# Patient Record
Sex: FEMALE | Race: WHITE | Employment: FULL TIME | URBAN - METROPOLITAN AREA
[De-identification: names, ages, dates, MRNs, and addresses within clinical notes are randomized per-mention and may not be internally consistent; named-entity substitution may affect disease eponyms.]

---

## 2020-08-17 VITALS
WEIGHT: 214 LBS | HEART RATE: 87 BPM | RESPIRATION RATE: 18 BRPM | TEMPERATURE: 97.8 F | OXYGEN SATURATION: 98 % | HEIGHT: 63 IN | DIASTOLIC BLOOD PRESSURE: 80 MMHG | SYSTOLIC BLOOD PRESSURE: 138 MMHG | BODY MASS INDEX: 37.92 KG/M2

## 2020-08-17 RX ORDER — FLUOXETINE HYDROCHLORIDE 20 MG/1
CAPSULE ORAL DAILY
COMMUNITY

## 2020-08-17 RX ORDER — MOMETASONE FUROATE 50 UG/1
2 SPRAY, METERED NASAL DAILY
COMMUNITY

## 2020-08-17 RX ORDER — FLUCONAZOLE 100 MG/1
200 TABLET ORAL DAILY
COMMUNITY

## 2020-08-17 RX ORDER — METHOCARBAMOL 750 MG/1
TABLET, FILM COATED ORAL 4 TIMES DAILY
COMMUNITY

## 2020-08-17 RX ORDER — AMLODIPINE BESYLATE 10 MG/1
TABLET ORAL DAILY
COMMUNITY

## 2020-08-17 RX ORDER — AMOXICILLIN AND CLAVULANATE POTASSIUM 875; 125 MG/1; MG/1
TABLET, FILM COATED ORAL EVERY 12 HOURS
COMMUNITY

## 2020-08-17 RX ORDER — METFORMIN HYDROCHLORIDE 1000 MG/1
1000 TABLET ORAL 2 TIMES DAILY WITH MEALS
COMMUNITY

## 2020-08-17 RX ORDER — GABAPENTIN 600 MG/1
TABLET ORAL 3 TIMES DAILY
COMMUNITY

## 2020-08-17 RX ORDER — LEVOTHYROXINE SODIUM 88 UG/1
TABLET ORAL
COMMUNITY

## 2020-08-17 RX ORDER — OMEPRAZOLE 40 MG/1
40 CAPSULE, DELAYED RELEASE ORAL DAILY
COMMUNITY

## 2020-08-17 RX ORDER — CELECOXIB 200 MG/1
CAPSULE ORAL 2 TIMES DAILY
COMMUNITY

## 2020-08-17 RX ORDER — LOVASTATIN 40 MG/1
40 TABLET ORAL
COMMUNITY

## 2020-08-17 RX ORDER — TRAMADOL HYDROCHLORIDE 50 MG/1
50 TABLET ORAL
COMMUNITY

## 2020-08-17 RX ORDER — NYSTATIN 100000 U/G
CREAM TOPICAL 2 TIMES DAILY
COMMUNITY

## 2020-08-17 RX ORDER — TRIAMTERENE AND HYDROCHLOROTHIAZIDE 37.5; 25 MG/1; MG/1
CAPSULE ORAL DAILY
COMMUNITY

## 2020-08-17 RX ORDER — VALSARTAN 320 MG/1
TABLET ORAL DAILY
COMMUNITY

## 2020-08-17 RX ORDER — LOSARTAN POTASSIUM 100 MG/1
100 TABLET ORAL DAILY
COMMUNITY

## 2020-08-17 RX ORDER — MELOXICAM 15 MG/1
15 TABLET ORAL DAILY
COMMUNITY

## 2020-08-17 RX ORDER — ALBUTEROL SULFATE 90 UG/1
AEROSOL, METERED RESPIRATORY (INHALATION)
COMMUNITY

## 2020-08-17 RX ORDER — DULAGLUTIDE 0.75 MG/.5ML
0.75 INJECTION, SOLUTION SUBCUTANEOUS
COMMUNITY

## 2020-08-17 RX ORDER — FLUTICASONE PROPIONATE AND SALMETEROL 250; 50 UG/1; UG/1
1 POWDER RESPIRATORY (INHALATION) EVERY 12 HOURS
COMMUNITY

## 2020-10-20 ENCOUNTER — TELEPHONE (OUTPATIENT)
Dept: ENT CLINIC | Age: 70
End: 2020-10-20

## 2020-10-20 NOTE — TELEPHONE ENCOUNTER
Need to reschedule appt.  she has with us on Thursday Janurary 14 due to Dr. Tim Gorman being in surgery.

## 2021-09-13 ENCOUNTER — TRANSCRIBE ORDER (OUTPATIENT)
Dept: SCHEDULING | Age: 71
End: 2021-09-13

## 2021-09-13 DIAGNOSIS — R07.9 CHEST PAIN: Primary | ICD-10-CM

## 2021-09-13 DIAGNOSIS — R42 DIZZINESS: Primary | ICD-10-CM

## 2021-09-13 DIAGNOSIS — R07.9 CHEST PAIN: ICD-10-CM

## 2021-09-14 ENCOUNTER — TRANSCRIBE ORDER (OUTPATIENT)
Dept: SCHEDULING | Age: 71
End: 2021-09-14

## 2021-09-14 DIAGNOSIS — R07.9 CHEST PAIN: Primary | ICD-10-CM

## 2021-09-17 ENCOUNTER — HOSPITAL ENCOUNTER (OUTPATIENT)
Dept: VASCULAR SURGERY | Age: 71
Discharge: HOME | End: 2021-09-17
Attending: NURSE PRACTITIONER
Payer: COMMERCIAL

## 2021-09-17 ENCOUNTER — HOSPITAL ENCOUNTER (OUTPATIENT)
Dept: NUCLEAR MEDICINE | Age: 71
Discharge: HOME | End: 2021-09-17
Attending: NURSE PRACTITIONER
Payer: COMMERCIAL

## 2021-09-17 ENCOUNTER — HOSPITAL ENCOUNTER (OUTPATIENT)
Dept: NON INVASIVE DIAGNOSTICS | Age: 71
Discharge: HOME | End: 2021-09-17
Attending: NURSE PRACTITIONER
Payer: COMMERCIAL

## 2021-09-17 DIAGNOSIS — R07.9 CHEST PAIN: ICD-10-CM

## 2021-09-17 DIAGNOSIS — R42 DIZZINESS: ICD-10-CM

## 2021-09-17 LAB
ECHO AO ROOT DIAM: 2.98 CM
ECHO AV AREA PEAK VELOCITY: 2.29 CM2
ECHO AV AREA VTI: 2.77 CM2
ECHO AV MEAN GRADIENT: 3.25 MMHG
ECHO AV MEAN VELOCITY: 83.52 CM/S
ECHO AV PEAK GRADIENT: 6.89 MMHG
ECHO AV PEAK VELOCITY: 131.22 CM/S
ECHO AV VTI: 21.92 CM
ECHO LA VOL 2C: 46.01 ML (ref 22–52)
ECHO LA VOL 4C: 34.43 ML (ref 22–52)
ECHO LA VOL BP: 46.34 ML (ref 22–52)
ECHO LV EDV A2C: 122.36 ML
ECHO LV EDV BP: 48.46 ML (ref 56–104)
ECHO LV EJECTION FRACTION A2C: 68 PERCENT
ECHO LV EJECTION FRACTION A4C: 78 PERCENT
ECHO LV EJECTION FRACTION A4C: 78 PERCENT
ECHO LV EJECTION FRACTION BIPLANE: 73.1 PERCENT (ref 55–100)
ECHO LV ESV A2C: 15.74 ML
ECHO LV ESV BP: 13.02 ML (ref 19–49)
ECHO LV INTERNAL DIMENSION DIASTOLIC: 5.07 CM (ref 3.9–5.3)
ECHO LV INTERNAL DIMENSION SYSTOLIC: 3.23 CM
ECHO LV IVSD: 0.94 CM (ref 0.6–0.9)
ECHO LV MASS 2D: 169.1 G (ref 67–162)
ECHO LV POSTERIOR WALL DIASTOLIC: 0.92 CM (ref 0.6–0.9)
ECHO LVOT DIAM: 1.85 CM
ECHO LVOT PEAK GRADIENT: 4.99 MMHG
ECHO LVOT PEAK VELOCITY: 111.65 CM/S
ECHO LVOT SV: 96.9 ML
ECHO LVOT SV: 96.9 ML
ECHO LVOT VTI: 22.54 CM
ECHO MV A VELOCITY: 103.5 CM/S
ECHO MV AREA PHT: 3.79 CM2
ECHO MV AREA PHT: 3.79 CM2
ECHO MV E DECELERATION TIME (DT): 200.25 MS
ECHO MV E VELOCITY: 80.99 CM/S
ECHO MV E/A RATIO: 0.78
ECHO MV PRESSURE HALF TIME (PHT): 58.07 MS
ECHO RA AREA 4C: 18 CM2
ECHO RV INTERNAL DIMENSION: 3.02 CM
LEFT CCA DIST DIAS: 20 CM/S
LEFT CCA DIST SYS: 71 CM/S
LEFT CCA PROX DIAS: 13 CM/S
LEFT CCA PROX SYS: 62 CM/S
LEFT ECA SYS: 94 CM/S
LEFT ICA DIST DIAS: 25 CM/S
LEFT ICA DIST SYS: 86 CM/S
LEFT ICA MID DIAS: 29 CM/S
LEFT ICA MID SYS: 114 CM/S
LEFT ICA PROX DIAS: 20 CM/S
LEFT ICA PROX SYS: 84 CM/S
LEFT ICA/CCA SYS: 1.6
LEFT SUBCLAVIAN SYS: 91 CM/S
LEFT VERTEBRAL SYS: 52 CM/S
LVOT MG: 2.32 MMHG
RIGHT CCA DIST DIAS: 19 CM/S
RIGHT CCA DIST SYS: 80 CM/S
RIGHT CCA PROX DIAS: 11 CM/S
RIGHT CCA PROX SYS: 73 CM/S
RIGHT ECA SYS: 107 CM/S
RIGHT ICA DIST DIAS: 26 CM/S
RIGHT ICA DIST SYS: 118 CM/S
RIGHT ICA MID DIAS: 24 CM/S
RIGHT ICA MID SYS: 66 CM/S
RIGHT ICA PROX DIAS: 23 CM/S
RIGHT ICA PROX SYS: 67 CM/S
RIGHT ICA/CCA SYS: 1.5
RIGHT SUBCLAVIAN SYS: 87 CM/S
RIGHT VERTEBRAL SYS: 48 CM/S
STRESS BASELINE DIAS BP: 78 MMHG
STRESS BASELINE HR: 79 BPM
STRESS BASELINE SYS BP: 136 MMHG
STRESS ESTIMATED WORKLOAD: 1 METS
STRESS EXERCISE DUR MIN: NORMAL MIN:SEC
STRESS PEAK DIAS BP: 70 MMHG
STRESS PEAK SYS BP: 169 MMHG
STRESS PERCENT HR ACHIEVED: 66 %
STRESS POST PEAK HR: 99 BPM
STRESS RATE PRESSURE PRODUCT: NORMAL BPM*MMHG
STRESS TARGET HR: 150 BPM

## 2021-09-17 PROCEDURE — 93880 EXTRACRANIAL BILAT STUDY: CPT

## 2021-09-17 PROCEDURE — 93306 TTE W/DOPPLER COMPLETE: CPT

## 2021-09-17 PROCEDURE — 74011000258 HC RX REV CODE- 258: Performed by: NURSE PRACTITIONER

## 2021-09-17 PROCEDURE — A9500 TC99M SESTAMIBI: HCPCS

## 2021-09-17 PROCEDURE — 74011250636 HC RX REV CODE- 250/636: Performed by: NURSE PRACTITIONER

## 2021-09-17 PROCEDURE — 93017 CV STRESS TEST TRACING ONLY: CPT

## 2021-09-17 RX ORDER — TETRAKIS(2-METHOXYISOBUTYLISOCYANIDE)COPPER(I) TETRAFLUOROBORATE 1 MG/ML
30 INJECTION, POWDER, LYOPHILIZED, FOR SOLUTION INTRAVENOUS
Status: COMPLETED | OUTPATIENT
Start: 2021-09-17 | End: 2021-09-17

## 2021-09-17 RX ORDER — TETRAKIS(2-METHOXYISOBUTYLISOCYANIDE)COPPER(I) TETRAFLUOROBORATE 1 MG/ML
10 INJECTION, POWDER, LYOPHILIZED, FOR SOLUTION INTRAVENOUS
Status: COMPLETED | OUTPATIENT
Start: 2021-09-17 | End: 2021-09-17

## 2021-09-17 RX ADMIN — TETRAKIS(2-METHOXYISOBUTYLISOCYANIDE)COPPER(I) TETRAFLUOROBORATE 10 MILLICURIE: 1 INJECTION, POWDER, LYOPHILIZED, FOR SOLUTION INTRAVENOUS at 07:26

## 2021-09-17 RX ADMIN — TETRAKIS(2-METHOXYISOBUTYLISOCYANIDE)COPPER(I) TETRAFLUOROBORATE 30 MILLICURIE: 1 INJECTION, POWDER, LYOPHILIZED, FOR SOLUTION INTRAVENOUS at 09:00

## 2021-09-17 RX ADMIN — ADENOSINE: 3 INJECTION INTRAVENOUS at 09:31

## 2023-04-15 ENCOUNTER — TRANSCRIBE ORDER (OUTPATIENT)
Dept: GENERAL RADIOLOGY | Age: 73
End: 2023-04-15

## 2023-04-15 ENCOUNTER — HOSPITAL ENCOUNTER (OUTPATIENT)
Dept: GENERAL RADIOLOGY | Age: 73
Discharge: HOME | End: 2023-04-15
Payer: MEDICARE

## 2023-04-15 DIAGNOSIS — R05.1 ACUTE COUGH: ICD-10-CM

## 2023-04-15 DIAGNOSIS — R07.1 CHEST PAIN ON BREATHING: ICD-10-CM

## 2023-04-15 PROCEDURE — 71046 X-RAY EXAM CHEST 2 VIEWS: CPT
